# Patient Record
Sex: FEMALE | Race: AMERICAN INDIAN OR ALASKA NATIVE | NOT HISPANIC OR LATINO | URBAN - METROPOLITAN AREA
[De-identification: names, ages, dates, MRNs, and addresses within clinical notes are randomized per-mention and may not be internally consistent; named-entity substitution may affect disease eponyms.]

---

## 2017-01-01 ENCOUNTER — INPATIENT (INPATIENT)
Facility: HOSPITAL | Age: 77
LOS: 0 days | End: 2017-07-16
Attending: INTERNAL MEDICINE | Admitting: INTERNAL MEDICINE
Payer: MEDICARE

## 2017-01-01 VITALS
DIASTOLIC BLOOD PRESSURE: 27 MMHG | OXYGEN SATURATION: 94 % | HEART RATE: 124 BPM | SYSTOLIC BLOOD PRESSURE: 74 MMHG | RESPIRATION RATE: 20 BRPM

## 2017-01-01 DIAGNOSIS — A41.9 SEPSIS, UNSPECIFIED ORGANISM: ICD-10-CM

## 2017-01-01 DIAGNOSIS — I10 ESSENTIAL (PRIMARY) HYPERTENSION: ICD-10-CM

## 2017-01-01 DIAGNOSIS — N17.9 ACUTE KIDNEY FAILURE, UNSPECIFIED: ICD-10-CM

## 2017-01-01 DIAGNOSIS — E11.9 TYPE 2 DIABETES MELLITUS WITHOUT COMPLICATIONS: ICD-10-CM

## 2017-01-01 DIAGNOSIS — I25.10 ATHEROSCLEROTIC HEART DISEASE OF NATIVE CORONARY ARTERY WITHOUT ANGINA PECTORIS: ICD-10-CM

## 2017-01-01 DIAGNOSIS — C85.90 NON-HODGKIN LYMPHOMA, UNSPECIFIED, UNSPECIFIED SITE: ICD-10-CM

## 2017-01-01 DIAGNOSIS — D64.9 ANEMIA, UNSPECIFIED: ICD-10-CM

## 2017-01-01 LAB
ALBUMIN SERPL ELPH-MCNC: 2.4 G/DL — LOW (ref 3.3–5)
ALBUMIN SERPL ELPH-MCNC: 2.8 G/DL — LOW (ref 3.3–5)
ALP SERPL-CCNC: 129 U/L — HIGH (ref 40–120)
ALP SERPL-CCNC: 145 U/L — HIGH (ref 40–120)
ALT FLD-CCNC: 47 U/L — HIGH (ref 4–33)
ALT FLD-CCNC: 51 U/L — HIGH (ref 4–33)
AMORPH CRY # UR COMP ASSIST: SIGNIFICANT CHANGE UP (ref 0–0)
ANISOCYTOSIS BLD QL: SLIGHT — SIGNIFICANT CHANGE UP
APPEARANCE UR: SIGNIFICANT CHANGE UP
APTT BLD: 31.1 SEC — SIGNIFICANT CHANGE UP (ref 27.5–37.4)
AST SERPL-CCNC: 45 U/L — HIGH (ref 4–32)
AST SERPL-CCNC: 76 U/L — HIGH (ref 4–32)
B PERT DNA SPEC QL NAA+PROBE: SIGNIFICANT CHANGE UP
BACTERIA # UR AUTO: SIGNIFICANT CHANGE UP
BASE EXCESS BLDA CALC-SCNC: -8.3 MMOL/L — SIGNIFICANT CHANGE UP
BASE EXCESS BLDV CALC-SCNC: -7.1 MMOL/L — SIGNIFICANT CHANGE UP
BASE EXCESS BLDV CALC-SCNC: -8.4 MMOL/L — SIGNIFICANT CHANGE UP
BASOPHILS # BLD AUTO: 0.01 K/UL — SIGNIFICANT CHANGE UP (ref 0–0.2)
BASOPHILS NFR BLD AUTO: 0.7 % — SIGNIFICANT CHANGE UP (ref 0–2)
BASOPHILS NFR SPEC: 0 % — SIGNIFICANT CHANGE UP (ref 0–2)
BILIRUB SERPL-MCNC: 0.5 MG/DL — SIGNIFICANT CHANGE UP (ref 0.2–1.2)
BILIRUB SERPL-MCNC: 0.5 MG/DL — SIGNIFICANT CHANGE UP (ref 0.2–1.2)
BILIRUB UR-MCNC: NEGATIVE — SIGNIFICANT CHANGE UP
BLD GP AB SCN SERPL QL: NEGATIVE — SIGNIFICANT CHANGE UP
BLOOD GAS VENOUS - CREATININE: 1.99 MG/DL — HIGH (ref 0.5–1.3)
BLOOD GAS VENOUS - CREATININE: 1.99 MG/DL — HIGH (ref 0.5–1.3)
BLOOD UR QL VISUAL: NEGATIVE — SIGNIFICANT CHANGE UP
BUN SERPL-MCNC: 29 MG/DL — HIGH (ref 7–23)
BUN SERPL-MCNC: 31 MG/DL — HIGH (ref 7–23)
C PNEUM DNA SPEC QL NAA+PROBE: NOT DETECTED — SIGNIFICANT CHANGE UP
CALCIUM SERPL-MCNC: 7.7 MG/DL — LOW (ref 8.4–10.5)
CALCIUM SERPL-MCNC: 8.4 MG/DL — SIGNIFICANT CHANGE UP (ref 8.4–10.5)
CHLORIDE BLDA-SCNC: 105 MMOL/L — SIGNIFICANT CHANGE UP (ref 96–108)
CHLORIDE BLDV-SCNC: 104 MMOL/L — SIGNIFICANT CHANGE UP (ref 96–108)
CHLORIDE BLDV-SCNC: 105 MMOL/L — SIGNIFICANT CHANGE UP (ref 96–108)
CHLORIDE SERPL-SCNC: 98 MMOL/L — SIGNIFICANT CHANGE UP (ref 98–107)
CHLORIDE SERPL-SCNC: 98 MMOL/L — SIGNIFICANT CHANGE UP (ref 98–107)
CK SERPL-CCNC: 23 U/L — LOW (ref 25–170)
CK SERPL-CCNC: 28 U/L — SIGNIFICANT CHANGE UP (ref 25–170)
CO2 SERPL-SCNC: 11 MMOL/L — LOW (ref 22–31)
CO2 SERPL-SCNC: 17 MMOL/L — LOW (ref 22–31)
COLOR SPEC: YELLOW — SIGNIFICANT CHANGE UP
CREAT BLDA-MCNC: 1.8 MG/DL — HIGH (ref 0.5–1.3)
CREAT SERPL-MCNC: 1.97 MG/DL — HIGH (ref 0.5–1.3)
CREAT SERPL-MCNC: 2.06 MG/DL — HIGH (ref 0.5–1.3)
EOSINOPHIL # BLD AUTO: 0.03 K/UL — SIGNIFICANT CHANGE UP (ref 0–0.5)
EOSINOPHIL NFR BLD AUTO: 2.1 % — SIGNIFICANT CHANGE UP (ref 0–6)
EOSINOPHIL NFR FLD: 2 % — SIGNIFICANT CHANGE UP (ref 0–6)
FLUAV H1 2009 PAND RNA SPEC QL NAA+PROBE: NOT DETECTED — SIGNIFICANT CHANGE UP
FLUAV H1 RNA SPEC QL NAA+PROBE: NOT DETECTED — SIGNIFICANT CHANGE UP
FLUAV H3 RNA SPEC QL NAA+PROBE: NOT DETECTED — SIGNIFICANT CHANGE UP
FLUAV SUBTYP SPEC NAA+PROBE: SIGNIFICANT CHANGE UP
FLUBV RNA SPEC QL NAA+PROBE: NOT DETECTED — SIGNIFICANT CHANGE UP
GAS PNL BLDV: 130 MMOL/L — LOW (ref 136–146)
GAS PNL BLDV: 134 MMOL/L — LOW (ref 136–146)
GLUCOSE BLDA-MCNC: 212 MG/DL — HIGH (ref 70–99)
GLUCOSE BLDV-MCNC: 176 — HIGH (ref 70–99)
GLUCOSE BLDV-MCNC: 189 — HIGH (ref 70–99)
GLUCOSE SERPL-MCNC: 134 MG/DL — HIGH (ref 70–99)
GLUCOSE SERPL-MCNC: 181 MG/DL — HIGH (ref 70–99)
GLUCOSE UR-MCNC: NEGATIVE — SIGNIFICANT CHANGE UP
GRAM STN SPT: SIGNIFICANT CHANGE UP
HADV DNA SPEC QL NAA+PROBE: NOT DETECTED — SIGNIFICANT CHANGE UP
HCO3 BLDA-SCNC: 18 MMOL/L — LOW (ref 22–26)
HCO3 BLDV-SCNC: 17 MMOL/L — LOW (ref 20–27)
HCO3 BLDV-SCNC: 18 MMOL/L — LOW (ref 20–27)
HCOV 229E RNA SPEC QL NAA+PROBE: NOT DETECTED — SIGNIFICANT CHANGE UP
HCOV HKU1 RNA SPEC QL NAA+PROBE: NOT DETECTED — SIGNIFICANT CHANGE UP
HCOV NL63 RNA SPEC QL NAA+PROBE: NOT DETECTED — SIGNIFICANT CHANGE UP
HCOV OC43 RNA SPEC QL NAA+PROBE: NOT DETECTED — SIGNIFICANT CHANGE UP
HCT VFR BLD CALC: 22.4 % — LOW (ref 34.5–45)
HCT VFR BLDA CALC: 25.3 % — LOW (ref 34.5–46.5)
HCT VFR BLDV CALC: 21.9 % — LOW (ref 34.5–45)
HCT VFR BLDV CALC: 26.4 % — LOW (ref 34.5–45)
HGB BLD-MCNC: 6.8 G/DL — CRITICAL LOW (ref 11.5–15.5)
HGB BLDA-MCNC: 8.1 G/DL — LOW (ref 11.5–15.5)
HGB BLDV-MCNC: 7 G/DL — CRITICAL LOW (ref 11.5–15.5)
HGB BLDV-MCNC: 8.5 G/DL — LOW (ref 11.5–15.5)
HMPV RNA SPEC QL NAA+PROBE: NOT DETECTED — SIGNIFICANT CHANGE UP
HPIV1 RNA SPEC QL NAA+PROBE: NOT DETECTED — SIGNIFICANT CHANGE UP
HPIV2 RNA SPEC QL NAA+PROBE: NOT DETECTED — SIGNIFICANT CHANGE UP
HPIV3 RNA SPEC QL NAA+PROBE: NOT DETECTED — SIGNIFICANT CHANGE UP
HPIV4 RNA SPEC QL NAA+PROBE: NOT DETECTED — SIGNIFICANT CHANGE UP
HYPOCHROMIA BLD QL: SLIGHT — SIGNIFICANT CHANGE UP
IMM GRANULOCYTES # BLD AUTO: 0.27 # — SIGNIFICANT CHANGE UP
IMM GRANULOCYTES NFR BLD AUTO: 19.1 % — HIGH (ref 0–1.5)
INR BLD: 1.87 — HIGH (ref 0.88–1.17)
KETONES UR-MCNC: NEGATIVE — SIGNIFICANT CHANGE UP
LACTATE BLDA-SCNC: 4 MMOL/L — CRITICAL HIGH (ref 0.5–2)
LACTATE BLDV-MCNC: 5.8 MMOL/L — CRITICAL HIGH (ref 0.5–2)
LACTATE BLDV-MCNC: 6.7 MMOL/L — CRITICAL HIGH (ref 0.5–2)
LACTATE SERPL-SCNC: 10.7 MMOL/L — CRITICAL HIGH (ref 0.5–2)
LEUKOCYTE ESTERASE UR-ACNC: NEGATIVE — SIGNIFICANT CHANGE UP
LYMPHOCYTES # BLD AUTO: 0.4 K/UL — LOW (ref 1–3.3)
LYMPHOCYTES # BLD AUTO: 28.4 % — SIGNIFICANT CHANGE UP (ref 13–44)
LYMPHOCYTES NFR SPEC AUTO: 31 % — SIGNIFICANT CHANGE UP (ref 13–44)
M PNEUMO DNA SPEC QL NAA+PROBE: NOT DETECTED — SIGNIFICANT CHANGE UP
MAGNESIUM SERPL-MCNC: 1.6 MG/DL — SIGNIFICANT CHANGE UP (ref 1.6–2.6)
MANUAL SMEAR VERIFICATION: SIGNIFICANT CHANGE UP
MCHC RBC-ENTMCNC: 25.4 PG — LOW (ref 27–34)
MCHC RBC-ENTMCNC: 30.4 % — LOW (ref 32–36)
MCV RBC AUTO: 83.6 FL — SIGNIFICANT CHANGE UP (ref 80–100)
MICROCYTES BLD QL: SLIGHT — SIGNIFICANT CHANGE UP
MONOCYTES # BLD AUTO: 0.21 K/UL — SIGNIFICANT CHANGE UP (ref 0–0.9)
MONOCYTES NFR BLD AUTO: 14.9 % — HIGH (ref 2–14)
MONOCYTES NFR BLD: 18 % — HIGH (ref 2–9)
MYELOCYTES NFR BLD: 1 % — HIGH (ref 0–0)
NEUTROPHIL AB SER-ACNC: 40 % — LOW (ref 43–77)
NEUTROPHILS # BLD AUTO: 0.49 K/UL — LOW (ref 1.8–7.4)
NEUTROPHILS NFR BLD AUTO: 34.8 % — LOW (ref 43–77)
NEUTS BAND # BLD: 8 % — HIGH (ref 0–6)
NITRITE UR-MCNC: NEGATIVE — SIGNIFICANT CHANGE UP
NRBC # FLD: 0 — SIGNIFICANT CHANGE UP
NT-PROBNP SERPL-SCNC: 4889 PG/ML — SIGNIFICANT CHANGE UP
OVALOCYTES BLD QL SMEAR: SLIGHT — SIGNIFICANT CHANGE UP
PCO2 BLDA: 37 MMHG — SIGNIFICANT CHANGE UP (ref 32–48)
PCO2 BLDV: 38 MMHG — LOW (ref 41–51)
PCO2 BLDV: 39 MMHG — LOW (ref 41–51)
PH BLDA: 7.28 PH — LOW (ref 7.35–7.45)
PH BLDV: 7.27 PH — LOW (ref 7.32–7.43)
PH BLDV: 7.3 PH — LOW (ref 7.32–7.43)
PH UR: 5.5 — SIGNIFICANT CHANGE UP (ref 4.6–8)
PHOSPHATE SERPL-MCNC: 6.4 MG/DL — HIGH (ref 2.5–4.5)
PLATELET # BLD AUTO: 175 K/UL — SIGNIFICANT CHANGE UP (ref 150–400)
PLATELET CLUMP BLD QL SMEAR: SLIGHT — SIGNIFICANT CHANGE UP
PLATELET COUNT - ESTIMATE: NORMAL — SIGNIFICANT CHANGE UP
PMV BLD: 12.3 FL — SIGNIFICANT CHANGE UP (ref 7–13)
PO2 BLDA: 110 MMHG — HIGH (ref 83–108)
PO2 BLDV: 36 MMHG — SIGNIFICANT CHANGE UP (ref 35–40)
PO2 BLDV: 50 MMHG — HIGH (ref 35–40)
POIKILOCYTOSIS BLD QL AUTO: SLIGHT — SIGNIFICANT CHANGE UP
POTASSIUM BLDA-SCNC: 4.3 MMOL/L — SIGNIFICANT CHANGE UP (ref 3.4–4.5)
POTASSIUM BLDV-SCNC: 3.8 MMOL/L — SIGNIFICANT CHANGE UP (ref 3.4–4.5)
POTASSIUM BLDV-SCNC: 4.6 MMOL/L — HIGH (ref 3.4–4.5)
POTASSIUM SERPL-MCNC: 4.4 MMOL/L — SIGNIFICANT CHANGE UP (ref 3.5–5.3)
POTASSIUM SERPL-MCNC: 5.7 MMOL/L — HIGH (ref 3.5–5.3)
POTASSIUM SERPL-SCNC: 4.4 MMOL/L — SIGNIFICANT CHANGE UP (ref 3.5–5.3)
POTASSIUM SERPL-SCNC: 5.7 MMOL/L — HIGH (ref 3.5–5.3)
PROT SERPL-MCNC: 5.3 G/DL — LOW (ref 6–8.3)
PROT SERPL-MCNC: 5.7 G/DL — LOW (ref 6–8.3)
PROT UR-MCNC: 30 — HIGH
PROTHROM AB SERPL-ACNC: 21.2 SEC — HIGH (ref 9.8–13.1)
RBC # BLD: 2.68 M/UL — LOW (ref 3.8–5.2)
RBC # FLD: 18.9 % — HIGH (ref 10.3–14.5)
RBC CASTS # UR COMP ASSIST: SIGNIFICANT CHANGE UP (ref 0–?)
REVIEW TO FOLLOW: YES — SIGNIFICANT CHANGE UP
RH IG SCN BLD-IMP: POSITIVE — SIGNIFICANT CHANGE UP
RH IG SCN BLD-IMP: POSITIVE — SIGNIFICANT CHANGE UP
RSV RNA SPEC QL NAA+PROBE: NOT DETECTED — SIGNIFICANT CHANGE UP
RV+EV RNA SPEC QL NAA+PROBE: POSITIVE — HIGH
SAO2 % BLDA: 97.7 % — SIGNIFICANT CHANGE UP (ref 95–99)
SAO2 % BLDV: 57.8 % — LOW (ref 60–85)
SAO2 % BLDV: 74.5 % — SIGNIFICANT CHANGE UP (ref 60–85)
SODIUM BLDA-SCNC: 132 MMOL/L — LOW (ref 136–146)
SODIUM SERPL-SCNC: 135 MMOL/L — SIGNIFICANT CHANGE UP (ref 135–145)
SODIUM SERPL-SCNC: 138 MMOL/L — SIGNIFICANT CHANGE UP (ref 135–145)
SP GR SPEC: 1.02 — SIGNIFICANT CHANGE UP (ref 1–1.03)
SPECIMEN SOURCE: SIGNIFICANT CHANGE UP
SQUAMOUS # UR AUTO: SIGNIFICANT CHANGE UP
TROPONIN T SERPL-MCNC: < 0.06 NG/ML — SIGNIFICANT CHANGE UP (ref 0–0.06)
TROPONIN T SERPL-MCNC: < 0.06 NG/ML — SIGNIFICANT CHANGE UP (ref 0–0.06)
UROBILINOGEN FLD QL: 1 E.U. — SIGNIFICANT CHANGE UP (ref 0.1–0.2)
WBC # BLD: 1.41 K/UL — LOW (ref 3.8–10.5)
WBC # FLD AUTO: 1.41 K/UL — LOW (ref 3.8–10.5)
WBC UR QL: SIGNIFICANT CHANGE UP (ref 0–?)

## 2017-01-01 PROCEDURE — 99291 CRITICAL CARE FIRST HOUR: CPT

## 2017-01-01 PROCEDURE — 71010: CPT | Mod: 26

## 2017-01-01 RX ORDER — CEFTRIAXONE 500 MG/1
1 INJECTION, POWDER, FOR SOLUTION INTRAMUSCULAR; INTRAVENOUS ONCE
Qty: 0 | Refills: 0 | Status: DISCONTINUED | OUTPATIENT
Start: 2017-01-01 | End: 2017-01-01

## 2017-01-01 RX ORDER — ACETAMINOPHEN 500 MG
650 TABLET ORAL ONCE
Qty: 0 | Refills: 0 | Status: COMPLETED | OUTPATIENT
Start: 2017-01-01 | End: 2017-01-01

## 2017-01-01 RX ORDER — NOREPINEPHRINE BITARTRATE/D5W 8 MG/250ML
0.1 PLASTIC BAG, INJECTION (ML) INTRAVENOUS
Qty: 8 | Refills: 0 | Status: DISCONTINUED | OUTPATIENT
Start: 2017-01-01 | End: 2017-01-01

## 2017-01-01 RX ORDER — ACETAMINOPHEN 500 MG
500 TABLET ORAL ONCE
Qty: 0 | Refills: 0 | Status: DISCONTINUED | OUTPATIENT
Start: 2017-01-01 | End: 2017-07-17

## 2017-01-01 RX ORDER — AZITHROMYCIN 500 MG/1
500 TABLET, FILM COATED ORAL EVERY 24 HOURS
Qty: 0 | Refills: 0 | Status: DISCONTINUED | OUTPATIENT
Start: 2017-01-01 | End: 2017-07-17

## 2017-01-01 RX ORDER — DEXTROSE 50 % IN WATER 50 %
1 SYRINGE (ML) INTRAVENOUS ONCE
Qty: 0 | Refills: 0 | Status: DISCONTINUED | OUTPATIENT
Start: 2017-01-01 | End: 2017-07-17

## 2017-01-01 RX ORDER — INSULIN LISPRO 100/ML
VIAL (ML) SUBCUTANEOUS AT BEDTIME
Qty: 0 | Refills: 0 | Status: DISCONTINUED | OUTPATIENT
Start: 2017-01-01 | End: 2017-07-17

## 2017-01-01 RX ORDER — INSULIN GLARGINE 100 [IU]/ML
25 INJECTION, SOLUTION SUBCUTANEOUS AT BEDTIME
Qty: 0 | Refills: 0 | Status: DISCONTINUED | OUTPATIENT
Start: 2017-01-01 | End: 2017-07-17

## 2017-01-01 RX ORDER — DOCUSATE SODIUM 100 MG
100 CAPSULE ORAL
Qty: 0 | Refills: 0 | Status: DISCONTINUED | OUTPATIENT
Start: 2017-01-01 | End: 2017-07-17

## 2017-01-01 RX ORDER — NOREPINEPHRINE BITARTRATE/D5W 8 MG/250ML
0.3 PLASTIC BAG, INJECTION (ML) INTRAVENOUS
Qty: 8 | Refills: 0 | Status: DISCONTINUED | OUTPATIENT
Start: 2017-01-01 | End: 2017-07-17

## 2017-01-01 RX ORDER — ACETAMINOPHEN 500 MG
650 TABLET ORAL EVERY 6 HOURS
Qty: 0 | Refills: 0 | Status: DISCONTINUED | OUTPATIENT
Start: 2017-01-01 | End: 2017-01-01

## 2017-01-01 RX ORDER — DEXTROSE 50 % IN WATER 50 %
25 SYRINGE (ML) INTRAVENOUS ONCE
Qty: 0 | Refills: 0 | Status: DISCONTINUED | OUTPATIENT
Start: 2017-01-01 | End: 2017-07-17

## 2017-01-01 RX ORDER — DEXTROSE 50 % IN WATER 50 %
12.5 SYRINGE (ML) INTRAVENOUS ONCE
Qty: 0 | Refills: 0 | Status: DISCONTINUED | OUTPATIENT
Start: 2017-01-01 | End: 2017-07-17

## 2017-01-01 RX ORDER — PIPERACILLIN AND TAZOBACTAM 4; .5 G/20ML; G/20ML
3.38 INJECTION, POWDER, LYOPHILIZED, FOR SOLUTION INTRAVENOUS ONCE
Qty: 0 | Refills: 0 | Status: COMPLETED | OUTPATIENT
Start: 2017-01-01 | End: 2017-01-01

## 2017-01-01 RX ORDER — SODIUM CHLORIDE 9 MG/ML
2000 INJECTION INTRAMUSCULAR; INTRAVENOUS; SUBCUTANEOUS ONCE
Qty: 0 | Refills: 0 | Status: COMPLETED | OUTPATIENT
Start: 2017-01-01 | End: 2017-01-01

## 2017-01-01 RX ORDER — CEFEPIME 1 G/1
2000 INJECTION, POWDER, FOR SOLUTION INTRAMUSCULAR; INTRAVENOUS EVERY 24 HOURS
Qty: 0 | Refills: 0 | Status: DISCONTINUED | OUTPATIENT
Start: 2017-01-01 | End: 2017-07-17

## 2017-01-01 RX ORDER — INSULIN LISPRO 100/ML
VIAL (ML) SUBCUTANEOUS
Qty: 0 | Refills: 0 | Status: DISCONTINUED | OUTPATIENT
Start: 2017-01-01 | End: 2017-07-17

## 2017-01-01 RX ORDER — AZITHROMYCIN 500 MG/1
500 TABLET, FILM COATED ORAL ONCE
Qty: 0 | Refills: 0 | Status: DISCONTINUED | OUTPATIENT
Start: 2017-01-01 | End: 2017-01-01

## 2017-01-01 RX ORDER — GLUCAGON INJECTION, SOLUTION 0.5 MG/.1ML
1 INJECTION, SOLUTION SUBCUTANEOUS ONCE
Qty: 0 | Refills: 0 | Status: DISCONTINUED | OUTPATIENT
Start: 2017-01-01 | End: 2017-07-17

## 2017-01-01 RX ORDER — HEPARIN SODIUM 5000 [USP'U]/ML
5000 INJECTION INTRAVENOUS; SUBCUTANEOUS EVERY 8 HOURS
Qty: 0 | Refills: 0 | Status: DISCONTINUED | OUTPATIENT
Start: 2017-01-01 | End: 2017-07-17

## 2017-01-01 RX ORDER — SODIUM CHLORIDE 9 MG/ML
1000 INJECTION, SOLUTION INTRAVENOUS
Qty: 0 | Refills: 0 | Status: DISCONTINUED | OUTPATIENT
Start: 2017-01-01 | End: 2017-07-17

## 2017-01-01 RX ORDER — VANCOMYCIN HCL 1 G
1000 VIAL (EA) INTRAVENOUS ONCE
Qty: 0 | Refills: 0 | Status: COMPLETED | OUTPATIENT
Start: 2017-01-01 | End: 2017-01-01

## 2017-01-01 RX ORDER — SENNA PLUS 8.6 MG/1
2 TABLET ORAL AT BEDTIME
Qty: 0 | Refills: 0 | Status: DISCONTINUED | OUTPATIENT
Start: 2017-01-01 | End: 2017-07-17

## 2017-01-01 RX ADMIN — Medication: at 19:10

## 2017-01-01 RX ADMIN — PIPERACILLIN AND TAZOBACTAM 200 GRAM(S): 4; .5 INJECTION, POWDER, LYOPHILIZED, FOR SOLUTION INTRAVENOUS at 13:35

## 2017-01-01 RX ADMIN — Medication 11.62 MICROGRAM(S)/KG/MIN: at 14:18

## 2017-01-01 RX ADMIN — Medication 260 MILLIGRAM(S): at 21:46

## 2017-01-01 RX ADMIN — AZITHROMYCIN 250 MILLIGRAM(S): 500 TABLET, FILM COATED ORAL at 18:58

## 2017-01-01 RX ADMIN — CEFEPIME 100 MILLIGRAM(S): 1 INJECTION, POWDER, FOR SOLUTION INTRAMUSCULAR; INTRAVENOUS at 18:11

## 2017-01-01 RX ADMIN — SODIUM CHLORIDE 2000 MILLILITER(S): 9 INJECTION INTRAMUSCULAR; INTRAVENOUS; SUBCUTANEOUS at 13:00

## 2017-01-01 RX ADMIN — HEPARIN SODIUM 5000 UNIT(S): 5000 INJECTION INTRAVENOUS; SUBCUTANEOUS at 21:46

## 2017-01-01 RX ADMIN — Medication 11.62 MICROGRAM(S)/KG/MIN: at 18:16

## 2017-01-01 RX ADMIN — Medication 650 MILLIGRAM(S): at 18:12

## 2017-01-01 RX ADMIN — Medication 250 MILLIGRAM(S): at 14:18

## 2017-07-16 NOTE — ED PROVIDER NOTE - ATTENDING CONTRIBUTION TO CARE
Rogers agree with resident note  76 yr old female with hx of lymphoma last chemo in February, hx of neutropenic fever, recently returned from cruise and has developed sinus tenderness, cough productive of blackish/greenish sputum and intermittent fevers.  Was placed on levofloxacin without any improvement.  Over last 2 days worsening weakness and SOB.  Presents as notification with Bp 70/30 HR of 120    PE: weak but alert and coherent A+OX 3; dry mucous membranes; CTAB/L; s1 s2 no m/r/g abd soft/NT/ND ext: no edema    bedside echo: right lungs A lines and consolidation consistent with infection 1-2 B lines (not enough to classify as overload); heart hyperdynamic; LV>RV; IVC greater than 50 percent collapsibility  CXR shows parapneumonic right basilar process; sepsis workup with broad spectrum abx; IVF  pale conjunctiva awaiting CBC to see if pt requires a transfusion as well    have signed out to Dr. Monte agree with resident note  76 yr old female with hx of lymphoma last chemo in February, hx of neutropenic fever, recently returned from cruise and has developed sinus tenderness, cough productive of blackish/greenish sputum and intermittent fevers.  Was placed on levofloxacin without any improvement.  Over last 2 days worsening weakness and SOB.  Presents as notification with Bp 70/30 HR of 120    PE: weak but alert and coherent A+OX 3; dry mucous membranes; CTAB/L; s1 s2 no m/r/g abd soft/NT/ND ext: no edema    bedside echo: right lungs A lines and consolidation consistent with infection 1-2 B lines (not enough to classify as overload); heart hyperdynamic; LV>RV; IVC greater than 50 percent collapsibility  CXR shows right basilar process; sepsis workup with broad spectrum abx; IVF  pale conjunctiva awaiting CBC to see if pt requires a transfusion as well    have signed out to Dr. Monte

## 2017-07-16 NOTE — ED PROVIDER NOTE - PROGRESS NOTE DETAILS
Dr. Monte: Pt signed out to me by Dr. Mccloud. 76F h/o lymphoma last chemo Feb 2017 p/w productive cough and sob, tx'ed for PNA as outpatient with levaquin, p/w malaise and hypotension. On exam pt is pale, tachy, hypotensive and appears dehydrated. Awaiting labs, receiving IVF and HCAP abx. Will get MICU consult. Dr. Monte: SBP 80s, IVF dilated, will start pressors

## 2017-07-16 NOTE — H&P ADULT - PROBLEM SELECTOR PLAN 4
-FS BG q6 -Cr of 2.06, no Hx of kidney disease, likely represents WHIT, continue to trend  -renally dose cefepime

## 2017-07-16 NOTE — H&P ADULT - ATTENDING COMMENTS
Critically ill 76 year old woman with hx of htn, cad, cad s/p stents, CLL on chemo, recurrent sinusitis s/p failed outpt antibiotics here with dyspnea, fever, neutropenia. Septic shock requiring pressor support. Blood and urine cultures. Treat with vanc/cefepime/azithromycin given neutropenia and recent fever. Keep Hb>8.

## 2017-07-16 NOTE — ED PROVIDER NOTE - OBJECTIVE STATEMENT
75yo h/o lymphoma s/p chemo (last chemo 2/17) p/w weakness, SOB, worsening over 2 weeks, with pleuritic chest pain, cough. Pt recently on abx for sinusitis. BIBEMS with hypotension 70/30s, tachycardia 120s.  meds: asa, plavix, metformin

## 2017-07-16 NOTE — H&P ADULT - PROBLEM SELECTOR PLAN 5
-can hold home meds for now -FS BG q6  -home regime is 32U Lantis at night and 6U Humalog TID w/ meals  -will start

## 2017-07-16 NOTE — ED PROVIDER NOTE - MEDICAL DECISION MAKING DETAILS
75yo with cough, SOB, Chest pain, tachycardia, hypotension, sepsis likely 2/2 pna, labs, fluids, abx, admit, will call micu if not responding to fluids and remains hypotensive

## 2017-07-16 NOTE — H&P ADULT - NSHPPHYSICALEXAM_GEN_ALL_CORE
General:  Frail, cachectic appearing  Pulm:  expiratory wheeze bilaterally at lung base, crackles on the right lung base  CV:  regular rate and rhythm  GI:  nt nd soft  Skin:  Intact to gross observation

## 2017-07-16 NOTE — ED ADULT NURSE REASSESSMENT NOTE - NS ED NURSE REASSESS COMMENT FT1
ER pt AOX3 right side chest Med port accessed with 20g Brown N. IV fluids in progress, meds given and Levophed drip started with good results, ICU consult done pending dispo.     Mary Grace Peña RN
pt coming from home with weakness, hypotension, AOX 3 pt started to feel weak since last weak, denies CP/dizziness/N/V/ pt stated falling few weak ago injured right side ribs area denies LOC, Hx. PNA, Lymphoma last chemo given last wk, right side chest med port accessed with 20g labs done, 2nd IV to right last. arm 22g. angio cath.  pending dispo.    Mary Grace Peña RN
Er pt AOX 3 c/o weakness, on CM + hypotension on Levophed drip. report given to MICU primary RN Gypsy CHENG Hemoglobin 6.9 type and Screen 2nd specimen sent.       Mary Grace Peña RN

## 2017-07-16 NOTE — H&P ADULT - HISTORY OF PRESENT ILLNESS
Patient is a 76 yoF w/ PMHx sig of HTN, DM, CAD sp/ 2 stents and MI 2 years ago, CLL w/ last chemotherapy episode in february presents with weakness, acute dyspnea, and drainage per sinuses.  Since February, patient has had green-brown drainage per her nose and has had multiple rounds of sinusitis.  She has been treated with levoquin in May.  One week ago she had a fever of 101 and went to her primary care physician, who gave her amoxicillin for sinusitis.  She developed acute dyspnea and weakness which is new for her, which is why she was came to the ER today.  She is afebrile in the ER.  She denies any cardiac symptoms. 76F w/ PMHx sig of HTN, DM, CAD sp/ 2 stents and MI 2 years ago, CLL w/ last chemotherapy episode in february presents with weakness, acute dyspnea, and drainage per sinuses.  Since February, patient has had green-brown drainage per her nose and has had multiple rounds of sinusitis.  She has been treated with levoquin in May.  One week ago she had a fever of 101 and went to her primary care physician, who gave her amoxicillin for sinusitis.  She developed acute dyspnea and weakness which is new for her, which is why she was came to the ER today.  She is afebrile in the ER.  She denies any cardiac symptoms.

## 2017-07-16 NOTE — ED PROVIDER NOTE - CARE PLAN
Principal Discharge DX:	Sepsis, due to unspecified organism  Secondary Diagnosis:	Pneumonia of right lower lobe due to infectious organism Principal Discharge DX:	Sepsis, due to unspecified organism  Secondary Diagnosis:	Pneumonia of right lower lobe due to infectious organism  Secondary Diagnosis:	Anemia, unspecified type

## 2017-07-16 NOTE — H&P ADULT - ASSESSMENT
This is a 76 yoF w/ multiple medical comorbidities including HTN DM CAD s/p 2x stents and CLL (last chemo in February) who presented with weakness and acute dyspnea, which is new for her.  In the ED she was hypotensive with SBP in the 70s, was given 1.2 L of fluid with increase of SBP to the high 80s/low 90s and was started on Levophed.  CBC revealed neutropenia and severe anemia with HgB of 6.8.  She was given 1 dose of vancomycin and 1 dose of zosyn for presumed neutropenic fever / septic shock.  The MICU was consulted and the patient was transferred to the MICU.  Currently patient is afebrile, on levophed, and saturating well on room air. This is a 76 yoF w/ multiple medical comorbidities including HTN DM CAD s/p 2x stents and CLL (last chemo in February) who presented with weakness and acute dyspnea, which is new for her.  In the ED she was hypotensive with SBP in the 70s, was given 1.2 L of fluid with increase of SBP to the high 80s/low 90s and was started on Levophed.  CBC revealed neutropenia and severe anemia with HgB of 6.8.  She was given 1 dose of vancomycin and 1 dose of zosyn for presumed neutropenic fever / septic shock.  The MICU was consulted and the patient was transferred to the MICU.  Currently patient is afebrile, on levophed, and saturating well on room air.  Patient is DNR/DNI.

## 2017-07-16 NOTE — DISCHARGE NOTE FOR THE EXPIRED PATIENT - HOSPITAL COURSE
76F w/ hx of HTN, DM, CAD sp/ stents, CLL w/ last chemotherapy episode in february who p/w weakness, acute dyspnea, and drainage per sinuses.  Since February, patient has had green-brown drainage per her nose and has had multiple rounds of sinusitis, treated with levaquin in May.  One week ago she had a fever of 101 and went to her primary care physician, who gave her amoxicillin for sinusitis. She developed acute dyspnea and weakness which is new for her, which is why she was came to the ER today 17. She was given 1 dose of vancomycin and 1 dose of zosyn for presumed neutropenic fever / septic shock. Pan cultures were sent, and antibiotic regimen changed to vancomycin/cefepime/azitrhomyic. Pt. was transferred to the MICU for septic shock requiring pressure support. She continue to decline, and became tachycardic around 10:30 and  shortly after. Pt. was DNR/DNI, and pronounced dead at 10:36PM on 17. Next of kin () was at bedside. 76F w/ hx of HTN, DM, CAD sp/ stents, CLL w/ last chemotherapy episode in february, recurrent sinusitis s/p failed outpatient abx who p/w weakness, acute dyspnea, and drainage per sinuses.  Since February, patient has had green-brown drainage per her nose and has had multiple rounds of sinusitis, treated with levaquin in May.  One week ago she had a fever of 101 and went to her primary care physician, who gave her amoxicillin for sinusitis. She developed acute dyspnea and weakness which is new for her, which is why she came to the ER today 17. She was found to be neutropenic   and was given 1 dose of vancomycin and 1 dose of zosyn for presumed neutropenic fever / septic shock. Pan cultures were sent, and antibiotic regimen changed to vancomycin/cefepime/azithromyic. Pt. was transferred to the MICU for septic shock requiring pressure support. She continue to decline, and became tachycardic around 10:30 and  shortly after. Pt. was DNR/DNI, and pronounced dead at 10:36PM on 17. Next of kin () was at bedside. 76F w/ hx of HTN, DM, CAD sp/ stents, CLL w/ last chemotherapy episode in february, recurrent sinusitis s/p failed outpatient abx who p/w weakness, acute dyspnea, and drainage per sinuses.  Since February, patient has had green-brown drainage per her nose and has had multiple rounds of sinusitis, treated with levaquin in May.  One week ago she had a fever of 101 and went to her primary care physician, who gave her amoxicillin for sinusitis. She developed acute dyspnea and weakness which is new for her, which is why she came to the ER today 7/16/17. She was found to be neutropenic   and was given 1 dose of vancomycin and 1 dose of zosyn for presumed neutropenic fever / septic shock. Pan cultures were sent, and antibiotic regimen changed to vancomycin/cefepime/azithromyic. Pt. was transferred to the MICU for septic shock requiring pressure support. Called to see patient for unresponsiveness. On exam the patient did not respond to verbal or physical stimuli. Absent heart and breath sounds, pupils fixed and dilated, no corneal reflex. Pt. was DNR/DNI, and pronounced dead at 2236 on 7/16/17. Next of kin () was at bedside. Autopsy declined.

## 2017-07-16 NOTE — H&P ADULT - PROBLEM SELECTOR PLAN 1
-Received 1 dose of vanco/zosyn  -received 1.2L of fluid resuscitation in ER w/o significant improvement in hemodynamics  -started on a Levophed drip  -switched to cefepime  -current CBC displays a WBC of 1.41 indicative of leukopenia  -if febrile may consider adding antifungal  -coagulapathy of INR 1.9, may consider DIC panel if thrombosis develops  -Cr of 2.18, no Hx of kidney disease, likely represents WHIT, continue to trend -Received 1 dose of vanco/zosyn  -received 1.2L of fluid resuscitation in ER w/o significant improvement in hemodynamics  -started on a Levophed drip  -switched to cefepime  -current CBC displays a WBC of 1.41 indicative of leukopenia  -if febrile may consider adding antifungal  -coagulapathy of INR 1.9, may consider DIC panel if thrombosis develops

## 2017-07-17 LAB
SPECIMEN SOURCE: SIGNIFICANT CHANGE UP
SPECIMEN SOURCE: SIGNIFICANT CHANGE UP

## 2017-07-17 PROCEDURE — 99233 SBSQ HOSP IP/OBS HIGH 50: CPT | Mod: GC

## 2017-07-18 LAB — L PNEUMO AG UR QL: NEGATIVE — SIGNIFICANT CHANGE UP

## 2017-07-19 LAB
-  AMIKACIN: SIGNIFICANT CHANGE UP
-  AMIKACIN: SIGNIFICANT CHANGE UP
-  AZTREONAM: SIGNIFICANT CHANGE UP
-  AZTREONAM: SIGNIFICANT CHANGE UP
-  CEFEPIME: SIGNIFICANT CHANGE UP
-  CEFEPIME: SIGNIFICANT CHANGE UP
-  CEFTAZIDIME: SIGNIFICANT CHANGE UP
-  CEFTAZIDIME: SIGNIFICANT CHANGE UP
-  CIPROFLOXACIN: SIGNIFICANT CHANGE UP
-  CIPROFLOXACIN: SIGNIFICANT CHANGE UP
-  GENTAMICIN: SIGNIFICANT CHANGE UP
-  GENTAMICIN: SIGNIFICANT CHANGE UP
-  IMIPENEM: SIGNIFICANT CHANGE UP
-  IMIPENEM: SIGNIFICANT CHANGE UP
-  LEVOFLOXACIN: SIGNIFICANT CHANGE UP
-  LEVOFLOXACIN: SIGNIFICANT CHANGE UP
-  MEROPENEM: SIGNIFICANT CHANGE UP
-  MEROPENEM: SIGNIFICANT CHANGE UP
-  PIPERACILLIN/TAZOBACTAM: SIGNIFICANT CHANGE UP
-  PIPERACILLIN/TAZOBACTAM: SIGNIFICANT CHANGE UP
-  TOBRAMYCIN: SIGNIFICANT CHANGE UP
-  TOBRAMYCIN: SIGNIFICANT CHANGE UP
BACTERIA BLD CULT: SIGNIFICANT CHANGE UP
BACTERIA BLD CULT: SIGNIFICANT CHANGE UP
BACTERIA SPT RESP CULT: SIGNIFICANT CHANGE UP
GRAM STN SPT: SIGNIFICANT CHANGE UP
METHOD TYPE: SIGNIFICANT CHANGE UP
METHOD TYPE: SIGNIFICANT CHANGE UP
ORGANISM # SPEC MICROSCOPIC CNT: SIGNIFICANT CHANGE UP

## 2021-01-19 NOTE — H&P ADULT - NSHPPOAPULMEMBOLUS_GEN_A_CORE
What Type Of Note Output Would You Prefer (Optional)?: Bullet Format
How Severe Is Your Skin Lesion?: mild
Is This A New Presentation, Or A Follow-Up?: Skin Lesion
Additional History: CHAGO MNEON 02/2015
no

## 2022-04-18 NOTE — PATIENT PROFILE ADULT. - PRESSURE ULCER(S)
Brief Postoperative Note      Patient: Maritza Taylor  YOB: 1973  MRN: [id number]    Date of Procedure: 4/18/2022    Pre-Op Diagnosis: K83.8  BILIARY SLUDGE  K81.1  CHRONIC CHOLECYSTITIS    Post-Op Diagnosis: Same       Procedure(s):  LAPAROSCOPIC CHOLECYSTECTOMY WITH CHOLANGIOGRAM    Surgeon(s):  Elana Prajapati MD    Assistant:  Surgical Assistant: Liu Lang    Anesthesia: General    Estimated Blood Loss (mL): Minimal    Complications: None    Specimens:   ID Type Source Tests Collected by Time Destination   A : A- GALLBLADDER AND CONTENTS Tissue Tissue SURGICAL PATHOLOGY Elana Prajapati MD 4/18/2022 0901    B : B- LIVER BIOPSY Tissue Tissue SURGICAL PATHOLOGY Elana Prajapati MD 4/18/2022 0913        Implants:  * No implants in log *      Drains: * No LDAs found *    Findings: GB with sludge, removed.  IOC normal  Liver bx (Hemachromatosis) done    Electronically signed by Elana Prajapati MD on 4/18/2022 at 9:17 AM no